# Patient Record
Sex: MALE | Race: OTHER | HISPANIC OR LATINO | URBAN - METROPOLITAN AREA
[De-identification: names, ages, dates, MRNs, and addresses within clinical notes are randomized per-mention and may not be internally consistent; named-entity substitution may affect disease eponyms.]

---

## 2022-08-23 VITALS
HEART RATE: 82 BPM | DIASTOLIC BLOOD PRESSURE: 107 MMHG | TEMPERATURE: 97 F | HEIGHT: 72 IN | OXYGEN SATURATION: 99 % | WEIGHT: 231.93 LBS | RESPIRATION RATE: 16 BRPM | SYSTOLIC BLOOD PRESSURE: 174 MMHG

## 2022-08-23 RX ORDER — POVIDONE-IODINE 5 %
1 AEROSOL (ML) TOPICAL ONCE
Refills: 0 | Status: COMPLETED | OUTPATIENT
Start: 2022-08-24 | End: 2022-08-24

## 2022-08-23 RX ORDER — CHLORHEXIDINE GLUCONATE 213 G/1000ML
1 SOLUTION TOPICAL ONCE
Refills: 0 | Status: DISCONTINUED | OUTPATIENT
Start: 2022-08-24 | End: 2022-08-25

## 2022-08-23 RX ORDER — CARVEDILOL PHOSPHATE 80 MG/1
1 CAPSULE, EXTENDED RELEASE ORAL
Qty: 0 | Refills: 0 | DISCHARGE

## 2022-08-23 RX ORDER — AMLODIPINE BESYLATE 2.5 MG/1
1 TABLET ORAL
Qty: 0 | Refills: 0 | DISCHARGE
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
## 2022-08-23 NOTE — ASU PATIENT PROFILE, ADULT - PRO ARRIVE FROM
Hide Accession Number?: No Billing Type: Third-Party Bill Biopsy Method: Dermablade Anesthesia Type: 1% lidocaine without epinephrine Electrodesiccation Text: The wound bed was treated with electrodesiccation after the biopsy was performed. Consent: Written consent was obtained and risks were reviewed including but not limited to scarring, infection, bleeding, scabbing, incomplete removal, nerve damage and allergy to anesthesia. Post-Care Instructions: I reviewed with the patient in detail post-care instructions. Patient is to keep the biopsy site dry overnight, and then apply bacitracin twice daily until healed. Patient may apply hydrogen peroxide soaks to remove any crusting. Biopsy Type: H and E home Electrodesiccation And Curettage Text: The wound bed was treated with electrodesiccation and curettage after the biopsy was performed. Render Post-Care Instructions In Note?: yes Notification Instructions: Patient will be notified of biopsy results. However, patient instructed to call the office if not contacted within 2 weeks. Depth Of Biopsy: dermis Hemostasis: Tab's Silver Nitrate Text: The wound bed was treated with silver nitrate after the biopsy was performed. Detail Level: Detailed Size Of Lesion In Cm: 0 Dressing: bandage Curettage Text: The wound bed was treated with curettage after the biopsy was performed. Type Of Destruction Used: Curettage Information: Selecting Yes will display possible errors in your note based on the variables you have selected. This validation is only offered as a suggestion for you. PLEASE NOTE THAT THE VALIDATION TEXT WILL BE REMOVED WHEN YOU FINALIZE YOUR NOTE. IF YOU WANT TO FAX A PRELIMINARY NOTE YOU WILL NEED TO TOGGLE THIS TO 'NO' IF YOU DO NOT WANT IT IN YOUR FAXED NOTE. Wound Care: Vaseline Cryotherapy Text: The wound bed was treated with cryotherapy after the biopsy was performed. Anesthesia Volume In Cc: 0.5

## 2022-08-23 NOTE — H&P ADULT - NSHPPHYSICALEXAM_GEN_ALL_CORE
Gen: 46M, NAD  MSK: Decreased neck ROM secondary to pain      Rest of PE per MD clearance Gen: 46M, NAD  MSK: Decreased neck ROM secondary to pain   strength/biceps/triceps 5/5 bilateral upper ext  Sensation intact to light touch bilateral UE  2+ radial pulses       Rest of PE per MD clearance

## 2022-08-23 NOTE — H&P ADULT - NSHPLABSRESULTS_GEN_ALL_CORE
Preop CBC, BMP, PT/INR, PTT within normal range and reviewed per medical clearance  Cr- 1.30  Preop CXR within normal limits and reviewed per medical clearance   Preop EKG within normal limits and reviewed per medical clearance; sinus rhythm @ 95bpm  3M: DOS  T + S: DOS  COVID: *** Preop CBC, BMP, PT/INR, PTT within normal range and reviewed per medical clearance  Cr- 1.30  Preop CXR within normal limits and reviewed per medical clearance   Preop EKG within normal limits and reviewed per medical clearance; sinus rhythm @ 95bpm  3M: DOS  T + S: DOS

## 2022-08-23 NOTE — H&P ADULT - PROBLEM SELECTOR PLAN 1
Admit to Orthopedic Service for elective ACDF C5-C6    Medically cleared and optimized for surgery by KIRILL Wray; Dr. Sprague

## 2022-08-24 ENCOUNTER — INPATIENT (INPATIENT)
Facility: HOSPITAL | Age: 47
LOS: 0 days | Discharge: HOME | DRG: 472 | End: 2022-08-25
Attending: ORTHOPAEDIC SURGERY | Admitting: ORTHOPAEDIC SURGERY
Payer: COMMERCIAL

## 2022-08-24 DIAGNOSIS — M48.02 SPINAL STENOSIS, CERVICAL REGION: ICD-10-CM

## 2022-08-24 DIAGNOSIS — I10 ESSENTIAL (PRIMARY) HYPERTENSION: ICD-10-CM

## 2022-08-24 LAB
BLD GP AB SCN SERPL QL: NEGATIVE — SIGNIFICANT CHANGE UP
RH IG SCN BLD-IMP: POSITIVE — SIGNIFICANT CHANGE UP

## 2022-08-24 PROCEDURE — 22853 INSJ BIOMECHANICAL DEVICE: CPT | Mod: AS

## 2022-08-24 PROCEDURE — 22551 ARTHRD ANT NTRBDY CERVICAL: CPT | Mod: AS

## 2022-08-24 PROCEDURE — 22845 INSERT SPINE FIXATION DEVICE: CPT | Mod: AS,59

## 2022-08-24 DEVICE — SCREW LOKG 3.5X14MM: Type: IMPLANTABLE DEVICE | Status: FUNCTIONAL

## 2022-08-24 DEVICE — IMPLANTABLE DEVICE: Type: IMPLANTABLE DEVICE | Status: FUNCTIONAL

## 2022-08-24 DEVICE — SURGIFLO HEMOSTATIC MATRIX KIT: Type: IMPLANTABLE DEVICE | Status: FUNCTIONAL

## 2022-08-24 RX ORDER — BENZOCAINE AND MENTHOL 5; 1 G/100ML; G/100ML
1 LIQUID ORAL
Qty: 12 | Refills: 0
Start: 2022-08-24 | End: 2022-08-26

## 2022-08-24 RX ORDER — CARVEDILOL PHOSPHATE 80 MG/1
12.5 CAPSULE, EXTENDED RELEASE ORAL EVERY 12 HOURS
Refills: 0 | Status: DISCONTINUED | OUTPATIENT
Start: 2022-08-24 | End: 2022-08-25

## 2022-08-24 RX ORDER — OXYCODONE HYDROCHLORIDE 5 MG/1
5 TABLET ORAL EVERY 4 HOURS
Refills: 0 | Status: DISCONTINUED | OUTPATIENT
Start: 2022-08-24 | End: 2022-08-25

## 2022-08-24 RX ORDER — LANOLIN ALCOHOL/MO/W.PET/CERES
5 CREAM (GRAM) TOPICAL AT BEDTIME
Refills: 0 | Status: DISCONTINUED | OUTPATIENT
Start: 2022-08-24 | End: 2022-08-25

## 2022-08-24 RX ORDER — SODIUM CHLORIDE 9 MG/ML
1000 INJECTION, SOLUTION INTRAVENOUS
Refills: 0 | Status: DISCONTINUED | OUTPATIENT
Start: 2022-08-24 | End: 2022-08-25

## 2022-08-24 RX ORDER — ONDANSETRON 8 MG/1
4 TABLET, FILM COATED ORAL ONCE
Refills: 0 | Status: DISCONTINUED | OUTPATIENT
Start: 2022-08-24 | End: 2022-08-25

## 2022-08-24 RX ORDER — HYDROMORPHONE HYDROCHLORIDE 2 MG/ML
0.5 INJECTION INTRAMUSCULAR; INTRAVENOUS; SUBCUTANEOUS ONCE
Refills: 0 | Status: DISCONTINUED | OUTPATIENT
Start: 2022-08-24 | End: 2022-08-25

## 2022-08-24 RX ORDER — CEFAZOLIN SODIUM 1 G
2000 VIAL (EA) INJECTION EVERY 8 HOURS
Refills: 0 | Status: COMPLETED | OUTPATIENT
Start: 2022-08-24 | End: 2022-08-25

## 2022-08-24 RX ORDER — GABAPENTIN 400 MG/1
300 CAPSULE ORAL ONCE
Refills: 0 | Status: COMPLETED | OUTPATIENT
Start: 2022-08-24 | End: 2022-08-24

## 2022-08-24 RX ORDER — CYCLOBENZAPRINE HYDROCHLORIDE 10 MG/1
5 TABLET, FILM COATED ORAL THREE TIMES A DAY
Refills: 0 | Status: DISCONTINUED | OUTPATIENT
Start: 2022-08-24 | End: 2022-08-25

## 2022-08-24 RX ORDER — APREPITANT 80 MG/1
40 CAPSULE ORAL ONCE
Refills: 0 | Status: COMPLETED | OUTPATIENT
Start: 2022-08-24 | End: 2022-08-24

## 2022-08-24 RX ORDER — AMLODIPINE BESYLATE 2.5 MG/1
10 TABLET ORAL DAILY
Refills: 0 | Status: DISCONTINUED | OUTPATIENT
Start: 2022-08-24 | End: 2022-08-25

## 2022-08-24 RX ORDER — ACETAMINOPHEN 500 MG
2 TABLET ORAL
Qty: 0 | Refills: 0 | DISCHARGE
Start: 2022-08-24

## 2022-08-24 RX ORDER — CYCLOBENZAPRINE HYDROCHLORIDE 10 MG/1
1 TABLET, FILM COATED ORAL
Qty: 21 | Refills: 0
Start: 2022-08-24 | End: 2022-08-30

## 2022-08-24 RX ORDER — ACETAMINOPHEN 500 MG
1000 TABLET ORAL EVERY 8 HOURS
Refills: 0 | Status: COMPLETED | OUTPATIENT
Start: 2022-08-24 | End: 2022-08-25

## 2022-08-24 RX ORDER — HYDROMORPHONE HYDROCHLORIDE 2 MG/ML
1 INJECTION INTRAMUSCULAR; INTRAVENOUS; SUBCUTANEOUS ONCE
Refills: 0 | Status: DISCONTINUED | OUTPATIENT
Start: 2022-08-24 | End: 2022-08-25

## 2022-08-24 RX ORDER — OXYCODONE HYDROCHLORIDE 5 MG/1
1 TABLET ORAL
Qty: 28 | Refills: 0
Start: 2022-08-24 | End: 2022-08-30

## 2022-08-24 RX ORDER — OXYCODONE HYDROCHLORIDE 5 MG/1
10 TABLET ORAL EVERY 4 HOURS
Refills: 0 | Status: DISCONTINUED | OUTPATIENT
Start: 2022-08-24 | End: 2022-08-25

## 2022-08-24 RX ORDER — ACETAMINOPHEN 500 MG
1000 TABLET ORAL ONCE
Refills: 0 | Status: COMPLETED | OUTPATIENT
Start: 2022-08-24 | End: 2022-08-24

## 2022-08-24 RX ORDER — FENTANYL CITRATE 50 UG/ML
25 INJECTION INTRAVENOUS ONCE
Refills: 0 | Status: DISCONTINUED | OUTPATIENT
Start: 2022-08-24 | End: 2022-08-25

## 2022-08-24 RX ORDER — HYDROMORPHONE HYDROCHLORIDE 2 MG/ML
0.5 INJECTION INTRAMUSCULAR; INTRAVENOUS; SUBCUTANEOUS
Refills: 0 | Status: DISCONTINUED | OUTPATIENT
Start: 2022-08-24 | End: 2022-08-25

## 2022-08-24 RX ORDER — BENZOCAINE AND MENTHOL 5; 1 G/100ML; G/100ML
1 LIQUID ORAL EVERY 4 HOURS
Refills: 0 | Status: DISCONTINUED | OUTPATIENT
Start: 2022-08-24 | End: 2022-08-25

## 2022-08-24 RX ADMIN — Medication 1000 MILLIGRAM(S): at 13:55

## 2022-08-24 RX ADMIN — BENZOCAINE AND MENTHOL 1 LOZENGE: 5; 1 LIQUID ORAL at 15:42

## 2022-08-24 RX ADMIN — Medication 1000 MILLIGRAM(S): at 23:21

## 2022-08-24 RX ADMIN — Medication 1 APPLICATION(S): at 07:29

## 2022-08-24 RX ADMIN — OXYCODONE HYDROCHLORIDE 5 MILLIGRAM(S): 5 TABLET ORAL at 18:26

## 2022-08-24 RX ADMIN — Medication 1000 MILLIGRAM(S): at 22:21

## 2022-08-24 RX ADMIN — OXYCODONE HYDROCHLORIDE 5 MILLIGRAM(S): 5 TABLET ORAL at 13:24

## 2022-08-24 RX ADMIN — GABAPENTIN 300 MILLIGRAM(S): 400 CAPSULE ORAL at 07:22

## 2022-08-24 RX ADMIN — OXYCODONE HYDROCHLORIDE 5 MILLIGRAM(S): 5 TABLET ORAL at 19:26

## 2022-08-24 RX ADMIN — CARVEDILOL PHOSPHATE 12.5 MILLIGRAM(S): 80 CAPSULE, EXTENDED RELEASE ORAL at 18:26

## 2022-08-24 RX ADMIN — APREPITANT 40 MILLIGRAM(S): 80 CAPSULE ORAL at 07:22

## 2022-08-24 RX ADMIN — AMLODIPINE BESYLATE 10 MILLIGRAM(S): 2.5 TABLET ORAL at 15:57

## 2022-08-24 RX ADMIN — Medication 1000 MILLIGRAM(S): at 07:21

## 2022-08-24 RX ADMIN — Medication 100 MILLIGRAM(S): at 15:56

## 2022-08-24 RX ADMIN — SODIUM CHLORIDE 120 MILLILITER(S): 9 INJECTION, SOLUTION INTRAVENOUS at 12:57

## 2022-08-24 RX ADMIN — CYCLOBENZAPRINE HYDROCHLORIDE 5 MILLIGRAM(S): 10 TABLET, FILM COATED ORAL at 23:27

## 2022-08-24 RX ADMIN — CYCLOBENZAPRINE HYDROCHLORIDE 5 MILLIGRAM(S): 10 TABLET, FILM COATED ORAL at 13:54

## 2022-08-24 NOTE — DISCHARGE NOTE PROVIDER - NSDCCPTREATMENT_GEN_ALL_CORE_FT
PRINCIPAL PROCEDURE  Procedure: Anterior cervical discectomy with fusion, 1 level  Findings and Treatment:

## 2022-08-24 NOTE — PRE-ANESTHESIA EVALUATION ADULT - NSATTENDATTESTRD_GEN_ALL_CORE
The patient has been re-examined and I agree with the above assessment or I updated with my findings.
None known

## 2022-08-24 NOTE — DISCHARGE NOTE PROVIDER - NSDCFUADDINST_GEN_ALL_CORE_FT
No strenuous activity (bending/twisting), heavy lifting, driving or returning to work until cleared by MD.  You may shower. Remove dressing daily before shower, pat the area dry gently then reapply dry gauze dressing x 5 days, then leave incision open to air.   Do not remove your steri strips.   Try to have regular bowel movements, take stool softener or laxative if necessary.  May take pepcid or zantac for upset stomach.  Ice affected areas to decrease swelling.  Call to schedule an appt with Dr. Cho for follow up  Contact your doctor if you experience: fever greater than 101.5, chills, chest pain, difficulty breathing, redness or excessive drainage around the incision, other concerns.  No strenuous activity (bending/twisting), heavy lifting, driving or returning to work until cleared by MD.  You may shower. Remove dressing daily before shower, pat the area dry gently then reapply dry gauze dressing x 5 days, then leave incision open to air.   Do not remove your steri strips.   Try to have regular bowel movements, take stool softener or laxative if necessary.    Ice affected areas to decrease swelling.  Call to schedule an appt with Dr. Cho for follow up  Contact your doctor if you experience: fever greater than 101.5, chills, chest pain, difficulty breathing, redness or excessive drainage around the incision, other concerns.

## 2022-08-24 NOTE — DISCHARGE NOTE PROVIDER - HOSPITAL COURSE
Admit to Orthopaedics s/p ACDF C5-C6  Perioperative Antibiotics  DVT prophylaxis: SCDs  Occupational Therapy  Pain Management

## 2022-08-24 NOTE — DISCHARGE NOTE PROVIDER - CARE PROVIDER_API CALL
Marlon Cho  ORTHOPAEDIC SURGERY  [address] [place] [address]  Phone: [phone]  Fax: [phone]  Follow Up Time:

## 2022-08-24 NOTE — PRE-ANESTHESIA EVALUATION ADULT - NSANTHOSAYNRD_GEN_A_CORE
No. LOREN screening performed.  STOP BANG Legend: 0-2 = LOW Risk; 3-4 = INTERMEDIATE Risk; 5-8 = HIGH Risk

## 2022-08-24 NOTE — DISCHARGE NOTE PROVIDER - NSDCMRMEDTOKEN_GEN_ALL_CORE_FT
acetaminophen 500 mg oral tablet: 2 tab(s) orally every 8 hours  amLODIPine 10 mg oral tablet: 1 tab(s) orally once a day  carvedilol 12.5 mg oral tablet: 1 tab(s) orally 2 times a day  Cepacol Sore Throat Cherry 15 mg-3.6 mg mucous membrane lozenge: 1 lozenge orally every 6 hours MDD:4 as needed for sore throat  cyclobenzaprine 5 mg oral tablet: 1 tab(s) orally every 8 hours, As Needed -for muscle spasm MDD:3   oxyCODONE 5 mg oral tablet: 1 tab(s) orally every 6 hours, As Needed -for severe pain MDD:4

## 2022-08-24 NOTE — BRIEF OPERATIVE NOTE - NSICDXBRIEFPROCEDURE_GEN_ALL_CORE_FT
PROCEDURES:  Anterior cervical discectomy with fusion, 1 level 24-Aug-2022 10:59:18  Goldberg, Rebecca L

## 2022-08-24 NOTE — ASU DISCHARGE PLAN (ADULT/PEDIATRIC) - NS MD DC FALL RISK RISK
Additional Anesthesia Volume In Cc (Will Not Render If 0): 0 For information on Fall & Injury Prevention, visit: [email or web address] OR  [email or web address] OR  https://www.cdc.gov/steadi/patient.html

## 2022-08-24 NOTE — ASU DISCHARGE PLAN (ADULT/PEDIATRIC) - ASU DC SPECIAL INSTRUCTIONSFT
No strenuous activity (bending/twisting), heavy lifting, driving or returning to work until cleared by MD.  You may shower. Remove dressing daily before shower, pat the area dry gently then reapply dry gauze dressing x 5 days, then leave incision open to air. Do not pull on your steri strips. They will fall off on their own.   Wear your hard collar when you are out of bed.   Try to have regular bowel movements, take stool softener or laxative if necessary.  May take pepcid or zantac for upset stomach.    Call to schedule an appt with Dr. Cho for follow up.  Contact your doctor if you experience: fever greater than 101.5, chills, chest pain, difficulty breathing, redness or excessive drainage around the incision, other concerns.

## 2022-08-24 NOTE — ASU DISCHARGE PLAN (ADULT/PEDIATRIC) - CARE PROVIDER_API CALL
Marlon Cho  ORTHOPAEDIC SURGERY  [address] [place] [address]  Phone: [phone]  Fax: [phone]  Follow Up Time:

## 2022-08-25 VITALS
RESPIRATION RATE: 18 BRPM | SYSTOLIC BLOOD PRESSURE: 152 MMHG | DIASTOLIC BLOOD PRESSURE: 92 MMHG | TEMPERATURE: 98 F | OXYGEN SATURATION: 97 % | HEART RATE: 81 BPM

## 2022-08-25 PROCEDURE — C1889: CPT

## 2022-08-25 PROCEDURE — 76000 FLUOROSCOPY <1 HR PHYS/QHP: CPT

## 2022-08-25 PROCEDURE — 97161 PT EVAL LOW COMPLEX 20 MIN: CPT

## 2022-08-25 PROCEDURE — 86850 RBC ANTIBODY SCREEN: CPT

## 2022-08-25 PROCEDURE — 86900 BLOOD TYPING SEROLOGIC ABO: CPT

## 2022-08-25 PROCEDURE — C1713: CPT

## 2022-08-25 PROCEDURE — 86901 BLOOD TYPING SEROLOGIC RH(D): CPT

## 2022-08-25 RX ADMIN — Medication 1000 MILLIGRAM(S): at 07:02

## 2022-08-25 RX ADMIN — OXYCODONE HYDROCHLORIDE 5 MILLIGRAM(S): 5 TABLET ORAL at 06:03

## 2022-08-25 RX ADMIN — Medication 100 MILLIGRAM(S): at 00:00

## 2022-08-25 RX ADMIN — OXYCODONE HYDROCHLORIDE 5 MILLIGRAM(S): 5 TABLET ORAL at 07:03

## 2022-08-25 RX ADMIN — CARVEDILOL PHOSPHATE 12.5 MILLIGRAM(S): 80 CAPSULE, EXTENDED RELEASE ORAL at 06:01

## 2022-08-25 RX ADMIN — AMLODIPINE BESYLATE 10 MILLIGRAM(S): 2.5 TABLET ORAL at 06:01

## 2022-08-25 RX ADMIN — Medication 1000 MILLIGRAM(S): at 06:02

## 2022-08-25 NOTE — OCCUPATIONAL THERAPY INITIAL EVALUATION ADULT - GENERAL OBSERVATIONS, REHAB EVAL
Pt received semisupine in bed NAD, +IVL, +tele, + B SCDs, + cervical collar, + cervical dressing C/I/D

## 2022-08-25 NOTE — DISCHARGE NOTE NURSING/CASE MANAGEMENT/SOCIAL WORK - PATIENT PORTAL LINK FT
You can access the FollowMyHealth Patient Portal offered by [facility] by registering at the following website: [email or web address]. By joining [employer]’s FollowMyHealth portal, you will also be able to view your health information using other applications (apps) compatible with our system.

## 2022-08-25 NOTE — PROGRESS NOTE ADULT - SUBJECTIVE AND OBJECTIVE BOX
Orthopaedics Post Op Check    Procedure: ACDF C5-C6  Surgeon: Dr. Cho    Pt comfortable, without complaints  Denies CP, SOB, N/V, numbness/tingling     Vital Signs Last 24 Hrs  T(C): 36.1 (24 Aug 2022 12:41), Max: 36.3 (24 Aug 2022 10:48)  T(F): 96.9 (24 Aug 2022 12:41), Max: 97.4 (24 Aug 2022 10:48)  HR: 92 (24 Aug 2022 13:18) (76 - 92)  BP: 157/90 (24 Aug 2022 13:18) (128/67 - 174/107)  BP(mean): 76 (24 Aug 2022 13:18) (76 - 112)  RR: 27 (24 Aug 2022 13:18) (12 - 27)  SpO2: 97% (24 Aug 2022 13:18) (95% - 99%)    Parameters below as of 24 Aug 2022 13:18  Patient On (Oxygen Delivery Method): room air      AVSS, NAD    Dressing C/D/I  General: Pt Alert and oriented     Sensation intact to bilateral UE distally. Motor Strength 5/5 to /interossei/triceps/biceps/deltoid bilaterally.  AIN/PIN intact.   Radial pulses palpable bilaterally       Post op XR: fluoroscopy utilized intra operatively to confirm level     A/P: 46yMale POD#0 s/p  ACDF C5-C6   - Stable  - Pain Control  - DVT ppx: SCDs  - Post op abx: Ancef  - PT, WBS: WBAT  - Home tomorrow after overnight observation per anesthesia recs
Ortho Note    Pt comfortable without complaints, pain controlled  Denies CP, SOB, N/V, numbness/tingling     Vital Signs Last 24 Hrs  T(C): 36.6 (08-25-22 @ 06:00), Max: 36.6 (08-25-22 @ 06:00)  T(F): 97.8 (08-25-22 @ 06:00), Max: 97.8 (08-25-22 @ 06:00)  HR: 78 (08-25-22 @ 06:00) (78 - 78)  BP: 166/80 (08-25-22 @ 06:00) (166/80 - 166/80)  BP(mean): --  RR: 18 (08-25-22 @ 06:00) (18 - 18)  SpO2: 95% (08-25-22 @ 06:00) (95% - 95%)  AVSS    General: Pt Alert and oriented, NAD  DSG- gauze/teg C/D/I  Pulses: +2DP,WWP feet  Sensation: SILT BUE  Motor: 5/5 /bicep/tricep/ deltoid              A/P: 46yMale PO#1 s/p C5-C6 ACDF  - Stable  - Pain Control  - DVT ppx: SCDs  - PT, WBS: WBAT, cervical collar  - dispo: home today    Ortho Pager [phone]
No

## 2022-08-25 NOTE — DISCHARGE NOTE NURSING/CASE MANAGEMENT/SOCIAL WORK - NSDCPEFALRISK_GEN_ALL_CORE
For information on Fall & Injury Prevention, visit: [email or web address] OR  [email or web address] OR  https://www.cdc.gov/steadi/patient.html
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
## 2022-08-29 DIAGNOSIS — E66.9 OBESITY, UNSPECIFIED: ICD-10-CM

## 2022-08-29 DIAGNOSIS — M48.02 SPINAL STENOSIS, CERVICAL REGION: ICD-10-CM
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
## 2022-08-29 DIAGNOSIS — S13.161A DISLOCATION OF C5/C6 CERVICAL VERTEBRAE, INITIAL ENCOUNTER: ICD-10-CM

## 2022-08-29 DIAGNOSIS — F17.210 NICOTINE DEPENDENCE, CIGARETTES, UNCOMPLICATED: ICD-10-CM

## 2022-08-29 DIAGNOSIS — I10 ESSENTIAL (PRIMARY) HYPERTENSION: ICD-10-CM
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
## 2022-09-09 NOTE — OCCUPATIONAL THERAPY INITIAL EVALUATION ADULT - HEALTH SCREEN CRITERIA
----- Message from Rashid Diallo [phone]. fax [phone]  ---------------------------------------------------------------------------  --------------  [address]  [phone];  Do not leave any message, patient will call back for answer  ---------------------------------------------------------------------------  --------------  SCRIPT ANSWERS  undefined
Form received and in your to sign basket.
yes

## 2023-01-05 NOTE — PRE-ANESTHESIA EVALUATION ADULT - HEIGHT IN CM
182.88 Post-Care Instructions: I reviewed with the patient in detail post-care instructions. Patient is to wear sunprotection, and avoid picking at any of the treated lesions. Pt may apply Vaseline to crusted or scabbing areas. Detail Level: Detailed Number Of Freeze-Thaw Cycles: 1 freeze-thaw cycle Application Tool (Optional): Liquid Nitrogen Sprayer Duration Of Freeze Thaw-Cycle (Seconds): 5 Render Post-Care Instructions In Note?: yes Render Note In Bullet Format When Appropriate: No Aperture Size (Optional): C Consent: The patient's consent was obtained including but not limited to risks of crusting, scabbing, blistering, scarring, darker or lighter pigmentary change, recurrence, incomplete removal and infection.

## 2024-03-20 NOTE — PRE-ANESTHESIA EVALUATION ADULT - HEIGHT IN INCHES
0
Modify Regimen: When itching is controlled, reduce Rinvoq from daily to 3x weekly
Detail Level: Zone

## (undated) DEVICE — DRAPE INSTRUMENT POUCH 6.75" X 11"

## (undated) DEVICE — STAPLER SKIN PROXIMATE

## (undated) DEVICE — WARMING BLANKET LOWER ADULT

## (undated) DEVICE — VENODYNE/SCD SLEEVE CALF MEDIUM

## (undated) DEVICE — DRAPE TOWEL BLUE STICKY

## (undated) DEVICE — SYS  PLATELET AUTOLOGOUS FIBRIN

## (undated) DEVICE — MISONIX BONESCALPEL MICRO HOOK STANDARD SHAVER

## (undated) DEVICE — GLV 7.5 PROTEXIS (WHITE)

## (undated) DEVICE — NDL HYPO SAFE 18G X 1.5" (PINK)

## (undated) DEVICE — DRAPE 3/4 SHEET 52X76"

## (undated) DEVICE — PREP DURAPREP 26CC

## (undated) DEVICE — GOWN TRIMAX XXL

## (undated) DEVICE — MISONIX BONESCALPEL BLUNT BLADE & TUBESET 25MM

## (undated) DEVICE — NDL KIT BONE MARROW ASPIRATION 13G 5CM

## (undated) DEVICE — DRAPE BACK TABLE COVER 80X90"

## (undated) DEVICE — MISONIX BONESCALPEL IRRIGATION TUBE SET

## (undated) DEVICE — MIDAS REX LEGEND MATCH HEAD FLUTED SM BORE 3.0MM X 10CM

## (undated) DEVICE — GLV 7 PROTEXIS (WHITE)

## (undated) DEVICE — DRAPE C ARM 41X74"

## (undated) DEVICE — PACK SPINE

## (undated) DEVICE — MIDAS REX LEGEND TAPERED SM BORE 1.1MM X 8CM

## (undated) DEVICE — DRAPE 1/2 SHEET 40X57"